# Patient Record
Sex: FEMALE | Race: WHITE
[De-identification: names, ages, dates, MRNs, and addresses within clinical notes are randomized per-mention and may not be internally consistent; named-entity substitution may affect disease eponyms.]

---

## 2021-05-15 ENCOUNTER — TRANSCRIPTION ENCOUNTER (OUTPATIENT)
Age: 86
End: 2021-05-15

## 2022-12-16 ENCOUNTER — INPATIENT (INPATIENT)
Facility: HOSPITAL | Age: 87
LOS: 1 days | DRG: 871 | End: 2022-12-18
Attending: INTERNAL MEDICINE | Admitting: INTERNAL MEDICINE
Payer: MEDICARE

## 2022-12-16 VITALS
DIASTOLIC BLOOD PRESSURE: 75 MMHG | HEART RATE: 118 BPM | WEIGHT: 104.94 LBS | HEIGHT: 62 IN | TEMPERATURE: 102 F | SYSTOLIC BLOOD PRESSURE: 192 MMHG | RESPIRATION RATE: 48 BRPM | OXYGEN SATURATION: 94 %

## 2022-12-16 DIAGNOSIS — J96.01 ACUTE RESPIRATORY FAILURE WITH HYPOXIA: ICD-10-CM

## 2022-12-16 LAB
ALBUMIN SERPL ELPH-MCNC: 1.8 G/DL — LOW (ref 3.3–5)
ALP SERPL-CCNC: 179 U/L — HIGH (ref 40–120)
ALT FLD-CCNC: 55 U/L — HIGH (ref 10–45)
ANION GAP SERPL CALC-SCNC: 18 MMOL/L — HIGH (ref 5–17)
APTT BLD: 33.2 SEC — SIGNIFICANT CHANGE UP (ref 27.5–35.5)
AST SERPL-CCNC: 54 U/L — HIGH (ref 10–40)
BASOPHILS # BLD AUTO: 0.07 K/UL — SIGNIFICANT CHANGE UP (ref 0–0.2)
BASOPHILS NFR BLD AUTO: 0.4 % — SIGNIFICANT CHANGE UP (ref 0–2)
BILIRUB SERPL-MCNC: 0.6 MG/DL — SIGNIFICANT CHANGE UP (ref 0.2–1.2)
BUN SERPL-MCNC: 53 MG/DL — HIGH (ref 7–23)
CALCIUM SERPL-MCNC: 9.5 MG/DL — SIGNIFICANT CHANGE UP (ref 8.4–10.5)
CHLORIDE SERPL-SCNC: 105 MMOL/L — SIGNIFICANT CHANGE UP (ref 96–108)
CO2 SERPL-SCNC: 22 MMOL/L — SIGNIFICANT CHANGE UP (ref 22–31)
CREAT SERPL-MCNC: 1.2 MG/DL — SIGNIFICANT CHANGE UP (ref 0.5–1.3)
EGFR: 44 ML/MIN/1.73M2 — LOW
EOSINOPHIL # BLD AUTO: 0.04 K/UL — SIGNIFICANT CHANGE UP (ref 0–0.5)
EOSINOPHIL NFR BLD AUTO: 0.2 % — SIGNIFICANT CHANGE UP (ref 0–6)
FLUAV AG NPH QL: SIGNIFICANT CHANGE UP
FLUBV AG NPH QL: SIGNIFICANT CHANGE UP
GLUCOSE SERPL-MCNC: 390 MG/DL — HIGH (ref 70–99)
HCT VFR BLD CALC: 33.9 % — LOW (ref 34.5–45)
HGB BLD-MCNC: 11 G/DL — LOW (ref 11.5–15.5)
IMM GRANULOCYTES NFR BLD AUTO: 0.9 % — SIGNIFICANT CHANGE UP (ref 0–0.9)
INR BLD: 1.12 RATIO — SIGNIFICANT CHANGE UP (ref 0.88–1.16)
LACTATE SERPL-SCNC: 1.7 MMOL/L — SIGNIFICANT CHANGE UP (ref 0.7–2)
LYMPHOCYTES # BLD AUTO: 0.62 K/UL — LOW (ref 1–3.3)
LYMPHOCYTES # BLD AUTO: 3.6 % — LOW (ref 13–44)
MCHC RBC-ENTMCNC: 30.5 PG — SIGNIFICANT CHANGE UP (ref 27–34)
MCHC RBC-ENTMCNC: 32.4 GM/DL — SIGNIFICANT CHANGE UP (ref 32–36)
MCV RBC AUTO: 93.9 FL — SIGNIFICANT CHANGE UP (ref 80–100)
MONOCYTES # BLD AUTO: 0.54 K/UL — SIGNIFICANT CHANGE UP (ref 0–0.9)
MONOCYTES NFR BLD AUTO: 3.2 % — SIGNIFICANT CHANGE UP (ref 2–14)
NEUTROPHILS # BLD AUTO: 15.64 K/UL — HIGH (ref 1.8–7.4)
NEUTROPHILS NFR BLD AUTO: 91.7 % — HIGH (ref 43–77)
NRBC # BLD: 0 /100 WBCS — SIGNIFICANT CHANGE UP (ref 0–0)
PLATELET # BLD AUTO: 525 K/UL — HIGH (ref 150–400)
POTASSIUM SERPL-MCNC: 4.1 MMOL/L — SIGNIFICANT CHANGE UP (ref 3.5–5.3)
POTASSIUM SERPL-SCNC: 4.1 MMOL/L — SIGNIFICANT CHANGE UP (ref 3.5–5.3)
PROT SERPL-MCNC: 7.3 G/DL — SIGNIFICANT CHANGE UP (ref 6–8.3)
PROTHROM AB SERPL-ACNC: 13 SEC — SIGNIFICANT CHANGE UP (ref 10.5–13.4)
RBC # BLD: 3.61 M/UL — LOW (ref 3.8–5.2)
RBC # FLD: 13.4 % — SIGNIFICANT CHANGE UP (ref 10.3–14.5)
RSV RNA NPH QL NAA+NON-PROBE: SIGNIFICANT CHANGE UP
SARS-COV-2 RNA SPEC QL NAA+PROBE: DETECTED
SODIUM SERPL-SCNC: 145 MMOL/L — SIGNIFICANT CHANGE UP (ref 135–145)
WBC # BLD: 17.06 K/UL — HIGH (ref 3.8–10.5)
WBC # FLD AUTO: 17.06 K/UL — HIGH (ref 3.8–10.5)

## 2022-12-16 PROCEDURE — 70450 CT HEAD/BRAIN W/O DYE: CPT | Mod: 26

## 2022-12-16 PROCEDURE — 71045 X-RAY EXAM CHEST 1 VIEW: CPT | Mod: 26

## 2022-12-16 PROCEDURE — 99291 CRITICAL CARE FIRST HOUR: CPT

## 2022-12-16 PROCEDURE — 99291 CRITICAL CARE FIRST HOUR: CPT | Mod: CS

## 2022-12-16 PROCEDURE — 93010 ELECTROCARDIOGRAM REPORT: CPT | Mod: 76

## 2022-12-16 RX ORDER — ACETAMINOPHEN 500 MG
650 TABLET ORAL EVERY 6 HOURS
Refills: 0 | Status: DISCONTINUED | OUTPATIENT
Start: 2022-12-16 | End: 2022-12-18

## 2022-12-16 RX ORDER — DEXTROSE 50 % IN WATER 50 %
25 SYRINGE (ML) INTRAVENOUS ONCE
Refills: 0 | Status: DISCONTINUED | OUTPATIENT
Start: 2022-12-16 | End: 2022-12-17

## 2022-12-16 RX ORDER — DEXTROSE 50 % IN WATER 50 %
15 SYRINGE (ML) INTRAVENOUS ONCE
Refills: 0 | Status: DISCONTINUED | OUTPATIENT
Start: 2022-12-16 | End: 2022-12-17

## 2022-12-16 RX ORDER — ACETAMINOPHEN 500 MG
650 TABLET ORAL ONCE
Refills: 0 | Status: COMPLETED | OUTPATIENT
Start: 2022-12-16 | End: 2022-12-16

## 2022-12-16 RX ORDER — SODIUM CHLORIDE 9 MG/ML
1000 INJECTION INTRAMUSCULAR; INTRAVENOUS; SUBCUTANEOUS ONCE
Refills: 0 | Status: COMPLETED | OUTPATIENT
Start: 2022-12-16 | End: 2022-12-16

## 2022-12-16 RX ORDER — AZITHROMYCIN 500 MG/1
TABLET, FILM COATED ORAL
Refills: 0 | Status: DISCONTINUED | OUTPATIENT
Start: 2022-12-16 | End: 2022-12-17

## 2022-12-16 RX ORDER — METOPROLOL TARTRATE 50 MG
5 TABLET ORAL EVERY 6 HOURS
Refills: 0 | Status: DISCONTINUED | OUTPATIENT
Start: 2022-12-16 | End: 2022-12-16

## 2022-12-16 RX ORDER — CHLORHEXIDINE GLUCONATE 213 G/1000ML
1 SOLUTION TOPICAL
Refills: 0 | Status: DISCONTINUED | OUTPATIENT
Start: 2022-12-16 | End: 2022-12-17

## 2022-12-16 RX ORDER — CEFTRIAXONE 500 MG/1
1000 INJECTION, POWDER, FOR SOLUTION INTRAMUSCULAR; INTRAVENOUS EVERY 24 HOURS
Refills: 0 | Status: DISCONTINUED | OUTPATIENT
Start: 2022-12-17 | End: 2022-12-17

## 2022-12-16 RX ORDER — DEXAMETHASONE 0.5 MG/5ML
6 ELIXIR ORAL ONCE
Refills: 0 | Status: DISCONTINUED | OUTPATIENT
Start: 2022-12-16 | End: 2022-12-16

## 2022-12-16 RX ORDER — INSULIN LISPRO 100/ML
VIAL (ML) SUBCUTANEOUS EVERY 6 HOURS
Refills: 0 | Status: DISCONTINUED | OUTPATIENT
Start: 2022-12-16 | End: 2022-12-17

## 2022-12-16 RX ORDER — REMDESIVIR 5 MG/ML
200 INJECTION INTRAVENOUS EVERY 24 HOURS
Refills: 0 | Status: COMPLETED | OUTPATIENT
Start: 2022-12-16 | End: 2022-12-16

## 2022-12-16 RX ORDER — REMDESIVIR 5 MG/ML
100 INJECTION INTRAVENOUS EVERY 24 HOURS
Refills: 0 | Status: DISCONTINUED | OUTPATIENT
Start: 2022-12-17 | End: 2022-12-17

## 2022-12-16 RX ORDER — ENOXAPARIN SODIUM 100 MG/ML
30 INJECTION SUBCUTANEOUS EVERY 24 HOURS
Refills: 0 | Status: DISCONTINUED | OUTPATIENT
Start: 2022-12-16 | End: 2022-12-17

## 2022-12-16 RX ORDER — METOPROLOL TARTRATE 50 MG
5 TABLET ORAL ONCE
Refills: 0 | Status: DISCONTINUED | OUTPATIENT
Start: 2022-12-16 | End: 2022-12-16

## 2022-12-16 RX ORDER — GLUCAGON INJECTION, SOLUTION 0.5 MG/.1ML
1 INJECTION, SOLUTION SUBCUTANEOUS ONCE
Refills: 0 | Status: DISCONTINUED | OUTPATIENT
Start: 2022-12-16 | End: 2022-12-17

## 2022-12-16 RX ORDER — SODIUM CHLORIDE 9 MG/ML
1000 INJECTION, SOLUTION INTRAVENOUS
Refills: 0 | Status: COMPLETED | OUTPATIENT
Start: 2022-12-16 | End: 2022-12-16

## 2022-12-16 RX ORDER — DEXTROSE 50 % IN WATER 50 %
12.5 SYRINGE (ML) INTRAVENOUS ONCE
Refills: 0 | Status: DISCONTINUED | OUTPATIENT
Start: 2022-12-16 | End: 2022-12-17

## 2022-12-16 RX ORDER — METOPROLOL TARTRATE 50 MG
5 TABLET ORAL ONCE
Refills: 0 | Status: COMPLETED | OUTPATIENT
Start: 2022-12-16 | End: 2022-12-16

## 2022-12-16 RX ORDER — DILTIAZEM HCL 120 MG
10 CAPSULE, EXT RELEASE 24 HR ORAL ONCE
Refills: 0 | Status: COMPLETED | OUTPATIENT
Start: 2022-12-16 | End: 2022-12-16

## 2022-12-16 RX ORDER — SODIUM CHLORIDE 9 MG/ML
1000 INJECTION, SOLUTION INTRAVENOUS
Refills: 0 | Status: DISCONTINUED | OUTPATIENT
Start: 2022-12-16 | End: 2022-12-17

## 2022-12-16 RX ORDER — CEFTRIAXONE 500 MG/1
1000 INJECTION, POWDER, FOR SOLUTION INTRAMUSCULAR; INTRAVENOUS ONCE
Refills: 0 | Status: COMPLETED | OUTPATIENT
Start: 2022-12-16 | End: 2022-12-16

## 2022-12-16 RX ORDER — AZITHROMYCIN 500 MG/1
500 TABLET, FILM COATED ORAL ONCE
Refills: 0 | Status: COMPLETED | OUTPATIENT
Start: 2022-12-16 | End: 2022-12-16

## 2022-12-16 RX ORDER — CEFTRIAXONE 500 MG/1
INJECTION, POWDER, FOR SOLUTION INTRAMUSCULAR; INTRAVENOUS
Refills: 0 | Status: DISCONTINUED | OUTPATIENT
Start: 2022-12-16 | End: 2022-12-17

## 2022-12-16 RX ORDER — DEXAMETHASONE 0.5 MG/5ML
6 ELIXIR ORAL ONCE
Refills: 0 | Status: COMPLETED | OUTPATIENT
Start: 2022-12-16 | End: 2022-12-16

## 2022-12-16 RX ORDER — REMDESIVIR 5 MG/ML
INJECTION INTRAVENOUS
Refills: 0 | Status: DISCONTINUED | OUTPATIENT
Start: 2022-12-16 | End: 2022-12-17

## 2022-12-16 RX ORDER — METOPROLOL TARTRATE 50 MG
2.5 TABLET ORAL EVERY 6 HOURS
Refills: 0 | Status: DISCONTINUED | OUTPATIENT
Start: 2022-12-16 | End: 2022-12-17

## 2022-12-16 RX ORDER — AZITHROMYCIN 500 MG/1
500 TABLET, FILM COATED ORAL EVERY 24 HOURS
Refills: 0 | Status: DISCONTINUED | OUTPATIENT
Start: 2022-12-17 | End: 2022-12-17

## 2022-12-16 RX ORDER — DEXAMETHASONE 0.5 MG/5ML
6 ELIXIR ORAL DAILY
Refills: 0 | Status: DISCONTINUED | OUTPATIENT
Start: 2022-12-17 | End: 2022-12-17

## 2022-12-16 RX ADMIN — Medication 5 MILLIGRAM(S): at 21:36

## 2022-12-16 RX ADMIN — SODIUM CHLORIDE 1000 MILLILITER(S): 9 INJECTION INTRAMUSCULAR; INTRAVENOUS; SUBCUTANEOUS at 21:55

## 2022-12-16 RX ADMIN — Medication 650 MILLIGRAM(S): at 21:04

## 2022-12-16 RX ADMIN — SODIUM CHLORIDE 100 MILLILITER(S): 9 INJECTION, SOLUTION INTRAVENOUS at 22:12

## 2022-12-16 RX ADMIN — Medication 650 MILLIGRAM(S): at 20:04

## 2022-12-16 RX ADMIN — Medication 10 MILLIGRAM(S): at 21:36

## 2022-12-16 RX ADMIN — SODIUM CHLORIDE 75 MILLILITER(S): 9 INJECTION, SOLUTION INTRAVENOUS at 23:02

## 2022-12-16 RX ADMIN — Medication 6 MILLIGRAM(S): at 21:35

## 2022-12-16 RX ADMIN — SODIUM CHLORIDE 1000 MILLILITER(S): 9 INJECTION INTRAMUSCULAR; INTRAVENOUS; SUBCUTANEOUS at 20:55

## 2022-12-16 RX ADMIN — CEFTRIAXONE 1000 MILLIGRAM(S): 500 INJECTION, POWDER, FOR SOLUTION INTRAMUSCULAR; INTRAVENOUS at 23:28

## 2022-12-16 NOTE — PROVIDER CONTACT NOTE (EICU) - SITUATION
· HPI Objective Statement: 87-year-old female diagnosed with COVID 2 days ago nursing home resident brought in by EMS for evaluation of shortness of breath EMS found patient was hypoxic and placed on a nonrebreather patient is also unresponsive, the son signed a MOLST form and the patient is a DNR/DNI  Case discussed with ICU PA, Patient admitted for BIPAP.

## 2022-12-16 NOTE — H&P ADULT - NSICDXPASTMEDICALHX_GEN_ALL_CORE_FT
PAST MEDICAL HISTORY:  CVA (cerebrovascular accident)     Hypertension     Paroxysmal atrial fibrillation

## 2022-12-16 NOTE — PATIENT PROFILE ADULT - FALL HARM RISK - HARM RISK INTERVENTIONS

## 2022-12-16 NOTE — H&P ADULT - ASSESSMENT
87-year-old female diagnosed w/ HTN, PAF, on Eliquis, DM2, BIBEMS from [facility] for acute respiratory distress, lethargy, decreased O2 sats to 80's, was placed on NRBM.  In the ED, pt noted to have temp of 102, tachypneic to the 40's, so was switched to BiPAP.  Per son, pt was okay until 3 days ago, became letahrgic, developed a dry cough, chest congestion, dxed to have COVID 12/13. Pt was started on Molunupiravir 12/14.  Son states that yesterday pt seem better, but with decreased oral intake and was unable to do any PT.  Today, pt noted to be very lethargic, tachypneic.   Pt was apparently recently hospitalized at [facility] 3 weeks ago for CVA, was d/ki to [facility] last week.   While in the ED, pt noted to be all of a sudden in rapid afib 170's to 180's, /90, temp of 101.  Pt also received Decadron 6 mg IVP x 1, Rocephin, and Remdesivir.   Pt was given Cardizem 10 mg IVP, lopressor 5 mg IVP x 1 and started on LR.  Pt remains unresponsive, tachypneic to 40's.     Acute hypoxic respiratory failure  Pneumonia due to COVID 19  Atrial fib with RVR  GHADA, dehydration  Metabolic encephalopathy  Pt w/ hx of recent CVA    Admit-will upgrade to ICU - pt requiring NIV due to hypoxia, increased WOB  Cont Remdesivir - 5 day course  Cont Decadron 6 mg IV - 6 day course  IV hydration  Await CT Head  Cont AC - Lovenox   FFup labs  Prognosis guarded and poor- GOC - d/w 2 sons, had MOLST filled out- pt DNR/DNI but they favor trial of NIV at this time, I did open discussion of comfort measures, sons states they wanted to all medical therapy possible at this time.  They dont want feeding tube.    D/w ICU PA

## 2022-12-16 NOTE — ED PROVIDER NOTE - CARE PLAN
Principal Discharge DX:	Acute respiratory failure with hypoxia  Secondary Diagnosis:	AMS (altered mental status)   1

## 2022-12-16 NOTE — H&P ADULT - CONVERSATION DETAILS
Family aware of diagnosis and guarded prognosis, as explained by me.  RADHA reviewed again.  Pt is DNR/DNI but sons favor trial of NIV, in view of hypoxic resp and increased WOB.  Sons are not ready for comfort measures only, at this time.

## 2022-12-16 NOTE — ED PROVIDER NOTE - PHYSICAL EXAMINATION
General:     NAD, well-nourished, well-appearing, unresponsive  Head:     NC/AT, EOMI, oral mucosa moist  Neck:     supple  Lungs:     Coarse rhonchi  CVS:     S1S2, RRR, no m/g/r  Abd:     +BS, s/nt/nd, no organomegaly  Ext:    2+ radial and pedal pulses, no c/c/e  Neuro: grossly intact

## 2022-12-16 NOTE — ED PROVIDER NOTE - CLINICAL SUMMARY MEDICAL DECISION MAKING FREE TEXT BOX
Patient is a elderly female with history of multiple medical problems COVID-positive since yesterday brought in by EMS from nursing home for evaluation of shortness of breath and lethargy.  On exam patient has excessively labored  breathing and hypoxia patient was also had a course wheezing.  Patient was placed on a BiPAP and given a DuoNeb and IV steroid.  Meanwhile patient suddenly developed rapid A. fib and Cardizem was given rate was controlled.  Patient's condition and prognosis were discussed with the son the MOLST form was filled out patient was DNR/DNI.  Patient was advised admitted to AI unit.

## 2022-12-16 NOTE — ED ADULT TRIAGE NOTE - CHIEF COMPLAINT QUOTE
Pt. BIBA for difficulty breathing.  Pt. tachypneic and unresponsive as per family at bedside pt. is normally talking but soft spoken since she had a stroke 11/25/22.  Pt. Covid positive from facility.  ER MD to bedside and pt. placed from nonrebreather to bipap.  Pt. DNR/DNI.

## 2022-12-16 NOTE — ED PROVIDER NOTE - OBJECTIVE STATEMENT
87-year-old female diagnosed with COVID 2 days ago nursing home resident brought in by EMS for evaluation of shortness of breath EMS found patient was hypoxic and placed on a nonrebreather patient is also unresponsive, the son signed a MOLST form and the patient is a DNR/DNI

## 2022-12-16 NOTE — H&P ADULT - NSHPPHYSICALEXAM_GEN_ALL_CORE
Vital Signs (24 Hrs):  T(C): 36.7 (12-16-22 @ 22:19), Max: 38.8 (12-16-22 @ 19:30)  HR: 93 (12-16-22 @ 22:19) (93 - 183)  BP: 179/67 (12-16-22 @ 22:19) (114/81 - 204/70)  RR: 45 (12-16-22 @ 22:19) (43 - 50)  SpO2: 100% (12-16-22 @ 22:19) (88% - 100%)  Wt(kg): --  Daily Height in cm: 157.48 (16 Dec 2022 19:30)    Daily     I&O's Summary

## 2022-12-16 NOTE — H&P ADULT - WHAT MATTERS MOST
Sons are hoping that their mother will recover from illness, would want to give time for medications and interventions to possibly improve medical outcome, at this time.

## 2022-12-16 NOTE — ED ADULT NURSE NOTE - NSIMPLEMENTINTERV_GEN_ALL_ED
Implemented All Fall with Harm Risk Interventions:  [place] to call system. Call bell, personal items and telephone within reach. Instruct patient to call for assistance. Room bathroom lighting operational. Non-slip footwear when patient is off stretcher. Physically safe environment: no spills, clutter or unnecessary equipment. Stretcher in lowest position, wheels locked, appropriate side rails in place. Provide visual cue, wrist band, yellow gown, etc. Monitor gait and stability. Monitor for mental status changes and reorient to person, place, and time. Review medications for side effects contributing to fall risk. Reinforce activity limits and safety measures with patient and family. Provide visual clues: red socks.

## 2022-12-16 NOTE — CONSULT NOTE ADULT - SUBJECTIVE AND OBJECTIVE BOX
Patient is a 87y old  Female who presents with a chief complaint of acute hypoxic resp failure (16 Dec 2022 22:24)      BRIEF HOSPITAL COURSE:    87F , admitted with COVID-19, hypoxemia, intermittent rapid afib and fever to 104F  -initially was stable for floor admission, however then deteriorated, required BiPAP for tachypnea and hypoxia, aswell as intermittent HR's as high as 170    -as aptient unable to come off BiPAp will admit to ICU    she is DNR/DNI, MOLST in chart    PAST MEDICAL & SURGICAL HISTORY:  CVA (cerebrovascular accident)      Paroxysmal atrial fibrillation      Hypertension      No significant past surgical history        Allergies    No Known Allergies    Intolerances      FAMILY HISTORY:  No pertinent family history in first degree relatives        Family history otherwise noncontributory.    Social History: ***  Review of Systems:    ALL OTHER REVIEW OF SYSTEMS EXCEPT PER HPI NEGATIVE.      Medications:  azithromycin  IVPB      azithromycin  IVPB 500 milliGRAM(s) IV Intermittent once  cefTRIAXone   IVPB 1000 milliGRAM(s) IV Intermittent once  cefTRIAXone   IVPB      remdesivir  IVPB 200 milliGRAM(s) IV Intermittent every 24 hours  remdesivir  IVPB   IV Intermittent     metoprolol tartrate Injectable 2.5 milliGRAM(s) IV Push every 6 hours      acetaminophen  Suppository .. 650 milliGRAM(s) Rectal every 6 hours PRN      enoxaparin Injectable 30 milliGRAM(s) SubCutaneous every 24 hours        dextrose 50% Injectable 25 Gram(s) IV Push once  dextrose 50% Injectable 12.5 Gram(s) IV Push once  dextrose 50% Injectable 25 Gram(s) IV Push once  dextrose Oral Gel 15 Gram(s) Oral once  glucagon  Injectable 1 milliGRAM(s) IntraMuscular once  insulin lispro (ADMELOG) corrective regimen sliding scale   SubCutaneous every 6 hours    lactated ringers. 1000 milliLiter(s) IV Continuous <Continuous>      chlorhexidine 2% Cloths 1 Application(s) Topical <User Schedule>            ICU Vital Signs Last 24 Hrs  T(C): 36.7 (16 Dec 2022 22:19), Max: 38.8 (16 Dec 2022 19:30)  T(F): 98 (16 Dec 2022 22:19), Max: 101.8 (16 Dec 2022 19:30)  HR: 93 (16 Dec 2022 22:19) (93 - 183)  BP: 179/67 (16 Dec 2022 22:19) (114/81 - 204/70)  BP(mean): --  ABP: --  ABP(mean): --  RR: 45 (16 Dec 2022 22:19) (43 - 50)  SpO2: 100% (16 Dec 2022 22:19) (88% - 100%)    O2 Parameters below as of 16 Dec 2022 22:19  Patient On (Oxygen Delivery Method): BiPAP/CPAP          Vital Signs Last 24 Hrs  T(C): 36.7 (16 Dec 2022 22:19), Max: 38.8 (16 Dec 2022 19:30)  T(F): 98 (16 Dec 2022 22:19), Max: 101.8 (16 Dec 2022 19:30)  HR: 93 (16 Dec 2022 22:19) (93 - 183)  BP: 179/67 (16 Dec 2022 22:19) (114/81 - 204/70)  BP(mean): --  RR: 45 (16 Dec 2022 22:19) (43 - 50)  SpO2: 100% (16 Dec 2022 22:19) (88% - 100%)    Parameters below as of 16 Dec 2022 22:19  Patient On (Oxygen Delivery Method): BiPAP/CPAP            I&O's Detail        LABS:                        11.0   17.06 )-----------( 525      ( 16 Dec 2022 19:50 )             33.9     12-16    145  |  105  |  53<H>  ----------------------------<  390<H>  4.1   |  22  |  1.20    Ca    9.5      16 Dec 2022 19:50    TPro  7.3  /  Alb  1.8<L>  /  TBili  0.6  /  DBili  x   /  AST  54<H>  /  ALT  55<H>  /  AlkPhos  179<H>  12-16          CAPILLARY BLOOD GLUCOSE        PT/INR - ( 16 Dec 2022 19:50 )   PT: 13.0 sec;   INR: 1.12 ratio         PTT - ( 16 Dec 2022 19:50 )  PTT:33.2 sec    CULTURES:

## 2022-12-16 NOTE — ED ADULT NURSE NOTE - OBJECTIVE STATEMENT
Patient brought in by EMS from [facility] after having difficulty breathing, placed on a nonrebreather switched to Bipap by respiratory. Patient tested positive for COVID+ about two days ago. Family member signed a MOLST form for DNR/DNI. PMH of [place] in L breast and stoke. Family at bedside.

## 2022-12-16 NOTE — H&P ADULT - HISTORY OF PRESENT ILLNESS
87-year-old female diagnosed w/ HTN, PAF, on Eliquis, DM2, BIBEMS from [facility] for acute respiratory distress, lethargy, decreased O2 sats to 80's, was placed on NRBM.  In the ED, pt noted to have temp of 102, tachypneic to the 40's, so was switched to BiPAP.  Per son, pt was okay until 3 days ago, became letahrgic, developed a dry cough, chest congestion, dxed to have COVID 12/13. Pt was started on Molunupiravir 12/14.  Son states that yesterday pt seem better, but with decreased oral intake and was unable to do any PT.  Today, pt noted to be very lethargic, tachypneic.   Pt was apparently recently hospitalized at [facility] 3 weeks ago for CVA, was d/ki to [facility] last week.   While in the ED, pt noted to be all of a sudden in rapid afib 170's to 180's, /90, temp of 101.  Pt also received Decadron 6 mg IVP x 1, Rocephin, and Remdesivir.   Pt was given Cardizem 10 mg IVP, lopressor 5 mg IVP x 1 and started on LR.  Pt remains unresponsive, tachypneic to 40's.

## 2022-12-16 NOTE — ED ADULT NURSE REASSESSMENT NOTE - NS ED NURSE REASSESS COMMENT FT1
Patient noted to have HR 180s, MD Dsouza made aware, Order for Cardizem 10 mg given IVP, Patient Hr 150- 160s, MD made aware, order for Metoprolol 5mg IVP given. HR 95. Patient tried to wean off to 02 6L via nasal cannula, however patient pulse ox 88%. Patient switched back to BiPAP. Family at bedside. Aware of visitation rule. Visitation allowed for one visitor per nursing supervisor. Report given to ICU nurse, transferred to ICU with BiPAP and cardiac monitor in place.

## 2022-12-16 NOTE — CONSULT NOTE ADULT - ASSESSMENT
PLAN:    admit to ICU for BiPAP overnight  Patient currently on BiPAP  -will titrate IPAP and EPAP to maintain pH >7.30 and SaO2 >92%  -alarms reviewed  -NPO while on BiPAP   -starty remdesevir and decadron  -rocephin/azithromycin  -rate control with lopressor  -lovenox  -glycemic control, ISS  -if worsens, will ahve further goals ofc are discussions regarding possible transition to comfort care  -if improves and can come off BiPAp tranfer to AI service      discussed with Dr. shira Chan PLAN:    admit to ICU for BiPAP overnight  Patient currently on BiPAP  -will titrate IPAP and EPAP to maintain pH >7.30 and SaO2 >92%  -alarms reviewed  -NPO while on BiPAP   -starty remdesevir and decadron  -rocephin/azithromycin  -rate control with lopressor  -lovenox  -glycemic control, ISS  -if worsens, will ahve further goals ofc are discussions regarding possible transition to comfort care  -if improves and can come off BiPAp tranfer to AI service  -will go for head CT as Hx of CVAs and currently she is not responsive      discussed with Dr. shira Chan

## 2022-12-17 LAB
ANION GAP SERPL CALC-SCNC: 14 MMOL/L — SIGNIFICANT CHANGE UP (ref 5–17)
BUN SERPL-MCNC: 54 MG/DL — HIGH (ref 7–23)
CALCIUM SERPL-MCNC: 9 MG/DL — SIGNIFICANT CHANGE UP (ref 8.4–10.5)
CHLORIDE SERPL-SCNC: 109 MMOL/L — HIGH (ref 96–108)
CO2 SERPL-SCNC: 25 MMOL/L — SIGNIFICANT CHANGE UP (ref 22–31)
CREAT SERPL-MCNC: 1.11 MG/DL — SIGNIFICANT CHANGE UP (ref 0.5–1.3)
EGFR: 49 ML/MIN/1.73M2 — LOW
GLUCOSE BLDC GLUCOMTR-MCNC: 354 MG/DL — HIGH (ref 70–99)
GLUCOSE BLDC GLUCOMTR-MCNC: 379 MG/DL — HIGH (ref 70–99)
GLUCOSE SERPL-MCNC: 400 MG/DL — HIGH (ref 70–99)
HCT VFR BLD CALC: 29 % — LOW (ref 34.5–45)
HGB BLD-MCNC: 9.5 G/DL — LOW (ref 11.5–15.5)
MAGNESIUM SERPL-MCNC: 2.4 MG/DL — SIGNIFICANT CHANGE UP (ref 1.6–2.6)
MCHC RBC-ENTMCNC: 30.7 PG — SIGNIFICANT CHANGE UP (ref 27–34)
MCHC RBC-ENTMCNC: 32.8 GM/DL — SIGNIFICANT CHANGE UP (ref 32–36)
MCV RBC AUTO: 93.9 FL — SIGNIFICANT CHANGE UP (ref 80–100)
NRBC # BLD: 0 /100 WBCS — SIGNIFICANT CHANGE UP (ref 0–0)
PHOSPHATE SERPL-MCNC: 2.7 MG/DL — SIGNIFICANT CHANGE UP (ref 2.5–4.5)
PLATELET # BLD AUTO: 400 K/UL — SIGNIFICANT CHANGE UP (ref 150–400)
POTASSIUM SERPL-MCNC: 3.7 MMOL/L — SIGNIFICANT CHANGE UP (ref 3.5–5.3)
POTASSIUM SERPL-SCNC: 3.7 MMOL/L — SIGNIFICANT CHANGE UP (ref 3.5–5.3)
RBC # BLD: 3.09 M/UL — LOW (ref 3.8–5.2)
RBC # FLD: 13.6 % — SIGNIFICANT CHANGE UP (ref 10.3–14.5)
SODIUM SERPL-SCNC: 148 MMOL/L — HIGH (ref 135–145)
WBC # BLD: 16.24 K/UL — HIGH (ref 3.8–10.5)
WBC # FLD AUTO: 16.24 K/UL — HIGH (ref 3.8–10.5)

## 2022-12-17 RX ORDER — MORPHINE SULFATE 50 MG/1
2 CAPSULE, EXTENDED RELEASE ORAL ONCE
Refills: 0 | Status: DISCONTINUED | OUTPATIENT
Start: 2022-12-17 | End: 2022-12-17

## 2022-12-17 RX ORDER — INSULIN LISPRO 100/ML
VIAL (ML) SUBCUTANEOUS EVERY 4 HOURS
Refills: 0 | Status: DISCONTINUED | OUTPATIENT
Start: 2022-12-17 | End: 2022-12-17

## 2022-12-17 RX ORDER — INSULIN GLARGINE 100 [IU]/ML
10 INJECTION, SOLUTION SUBCUTANEOUS ONCE
Refills: 0 | Status: DISCONTINUED | OUTPATIENT
Start: 2022-12-17 | End: 2022-12-17

## 2022-12-17 RX ORDER — MORPHINE SULFATE 50 MG/1
4 CAPSULE, EXTENDED RELEASE ORAL
Qty: 100 | Refills: 0 | Status: DISCONTINUED | OUTPATIENT
Start: 2022-12-17 | End: 2022-12-18

## 2022-12-17 RX ORDER — POTASSIUM CHLORIDE 20 MEQ
10 PACKET (EA) ORAL
Refills: 0 | Status: DISCONTINUED | OUTPATIENT
Start: 2022-12-17 | End: 2022-12-17

## 2022-12-17 RX ORDER — MORPHINE SULFATE 50 MG/1
2 CAPSULE, EXTENDED RELEASE ORAL
Refills: 0 | Status: DISCONTINUED | OUTPATIENT
Start: 2022-12-17 | End: 2022-12-18

## 2022-12-17 RX ADMIN — MORPHINE SULFATE 2 MILLIGRAM(S): 50 CAPSULE, EXTENDED RELEASE ORAL at 12:36

## 2022-12-17 RX ADMIN — CHLORHEXIDINE GLUCONATE 1 APPLICATION(S): 213 SOLUTION TOPICAL at 06:43

## 2022-12-17 RX ADMIN — MORPHINE SULFATE 2 MILLIGRAM(S): 50 CAPSULE, EXTENDED RELEASE ORAL at 13:00

## 2022-12-17 RX ADMIN — MORPHINE SULFATE 2 MILLIGRAM(S): 50 CAPSULE, EXTENDED RELEASE ORAL at 10:05

## 2022-12-17 RX ADMIN — MORPHINE SULFATE 2 MILLIGRAM(S): 50 CAPSULE, EXTENDED RELEASE ORAL at 16:24

## 2022-12-17 RX ADMIN — MORPHINE SULFATE 2 MILLIGRAM(S): 50 CAPSULE, EXTENDED RELEASE ORAL at 13:58

## 2022-12-17 RX ADMIN — Medication 650 MILLIGRAM(S): at 02:15

## 2022-12-17 RX ADMIN — Medication 6 MILLIGRAM(S): at 06:08

## 2022-12-17 RX ADMIN — Medication 5: at 06:41

## 2022-12-17 RX ADMIN — Medication 2 MILLIGRAM(S): at 21:20

## 2022-12-17 RX ADMIN — MORPHINE SULFATE 2 MILLIGRAM(S): 50 CAPSULE, EXTENDED RELEASE ORAL at 19:53

## 2022-12-17 RX ADMIN — Medication 2.5 MILLIGRAM(S): at 01:21

## 2022-12-17 RX ADMIN — Medication 5: at 02:16

## 2022-12-17 RX ADMIN — MORPHINE SULFATE 2 MILLIGRAM(S): 50 CAPSULE, EXTENDED RELEASE ORAL at 20:34

## 2022-12-17 RX ADMIN — REMDESIVIR 200 MILLIGRAM(S): 5 INJECTION INTRAVENOUS at 01:20

## 2022-12-17 RX ADMIN — MORPHINE SULFATE 2 MILLIGRAM(S): 50 CAPSULE, EXTENDED RELEASE ORAL at 17:15

## 2022-12-17 RX ADMIN — MORPHINE SULFATE 4 MG/HR: 50 CAPSULE, EXTENDED RELEASE ORAL at 21:20

## 2022-12-17 RX ADMIN — Medication 650 MILLIGRAM(S): at 01:24

## 2022-12-17 RX ADMIN — MORPHINE SULFATE 2 MILLIGRAM(S): 50 CAPSULE, EXTENDED RELEASE ORAL at 10:20

## 2022-12-17 RX ADMIN — ENOXAPARIN SODIUM 30 MILLIGRAM(S): 100 INJECTION SUBCUTANEOUS at 01:22

## 2022-12-17 RX ADMIN — Medication 2.5 MILLIGRAM(S): at 06:09

## 2022-12-17 RX ADMIN — MORPHINE SULFATE 2 MILLIGRAM(S): 50 CAPSULE, EXTENDED RELEASE ORAL at 14:30

## 2022-12-17 RX ADMIN — AZITHROMYCIN 500 MILLIGRAM(S): 500 TABLET, FILM COATED ORAL at 01:21

## 2022-12-17 NOTE — DIETITIAN INITIAL EVALUATION ADULT - PERTINENT MEDS FT
MEDICATIONS  (STANDING):    MEDICATIONS  (PRN):  acetaminophen  Suppository .. 650 milliGRAM(s) Rectal every 6 hours PRN Temp greater or equal to 38C (100.4F)  morphine  - Injectable 2 milliGRAM(s) IV Push every 2 hours PRN Dyspnea, inc WOB and / or pain

## 2022-12-17 NOTE — DIETITIAN INITIAL EVALUATION ADULT - NSFNSPHYEXAMSKINFT_GEN_A_CORE
Group Therapy Note    Date: 10/7/2020    Group Start Time: 1005  Group End Time: [place]        Group Therapy Note    Attendees: 8    Patient's Goal: to actively engage in playing Music Bingo to increase memory recall, increase socialization, reminisce, encourage engagement in leisure, and improve mood. Notes:  Mel actively engaged in group while in attendance. Pt was excused from portion to meet with staff. eMl actively and independently recalled songs/artists and covered her bingo card. Pt participated in processing discussion and was observed smiling and positively socializing with peers. Status After Intervention:  Improved    Participation Level:  Active Listener and Interactive    Participation Quality: Appropriate, Attentive, Sharing and Supportive      Speech:  normal      Thought Process/Content: Linear      Affective Functioning: Congruent      Mood: euthymic      Level of consciousness:  Alert and Attentive      Response to Learning: Able to verbalize current knowledge/experience, Able to verbalize/acknowledge new learning and Progressing to goal      Endings: None Reported    Modes of Intervention: Support, Socialization, Problem-solving, Activity and Media      Discipline Responsible: Psychoeducational Specialist      Signature:  JERARDO Matos Pressure Injury 1: right buttock, Stage II  Pressure Injury 2: right lower buttock, Stage II  Pressure Injury 3: right buttock, Suspected deep tissue injury  Pressure Injury 4: none, none  Pressure Injury 5: none, none  Pressure Injury 6: none, none  Pressure Injury 7: none, none  Pressure Injury 8: none, none  Pressure Injury 9: none, none  Pressure Injury 10: none, none  Pressure Injury 11: none, none

## 2022-12-17 NOTE — PROGRESS NOTE ADULT - ASSESSMENT
Physical Examination:  GENERAL:               unresponsive mod  acute distress.    HEENT:                     JVD, Moist MM  PULM:                     Bilateral air entry, course to auscultation bilaterally, no significant sputum production, No Rales, No Rhonchi, No Wheezing  CVS:                         S1, S2,  +Murmur  ABD:                        Soft, nondistended, nontender, normoactive bowel sounds,   EXT:                         No edema, nontender, No Cyanosis or Clubbing   Vascular:                cool Extremities,   SKIN:                     Left breast lesion  NEURO:                  Unresponsive does not follow commands  PSYC:                      Calm,  no  Insight.       Assessment  Acute hypoxic respiratory Failure  COVID +   Metastatic breast cancer  recent CVA  DM2 with hyperglycemia     underlying HTN       Plan  Transition care to Comfort  d/c niv start nc  morphine push  d/c decadron remdisvir and antibiotics   new molst filled  d/c IVF  arguello for comfort        PMD:				                   Notified(Date):  Family Updated: 	Marek Khan and shaun bedside	                                 Date: 12/17/22      Sedation & Analgesia:	Morphine  Diet/Nutrition:		   Diet, NPO (12-16-22 @ 22:21) [Active]    GI PPx:			n/a    DVT Ppx:		n/a       Activity:		  bedrest  Head of Bed:               35-45 Deg  Glycemic Control:              Lines:  PIV   Restraints were deemed necessary to prevent removal of life-sustaining devices [  ] YES   [ n   ]  NO    Disposition:  Transfer to [facility]    Goals of Care: DNR/I Comfort care

## 2022-12-17 NOTE — DIETITIAN INITIAL EVALUATION ADULT - SIGNS/SYMPTOMS
PI on R buttocks stage 2, R lower buttock stage 2, R buttock suspected DTI. PO <75% x >7days, 3.3% wt loss x 1 week, 79% of IBW

## 2022-12-17 NOTE — PROGRESS NOTE ADULT - CONVERSATION DETAILS
Case discussed current satus  discussed how covid is potentially reversible but will take time to see improvement  patient currently lethargic minimally responsive unable to follow commands  discussed GOC and treatment options including NGT feeding and treatment of hyperglycemia with insulin drip  after detailed discussion they state patient would not want to be in this situation and would wand to be comfort measures  they request to be removed from NIV stop antibiotics and start comfort care  new Molst filled  understand that this direction will lead to her passing and do not want to continue aggressive care in ICU

## 2022-12-17 NOTE — DIETITIAN INITIAL EVALUATION ADULT - OTHER INFO
H&P: 86 y/o F dx of HTN, Breast cancer with mets  PAF, DM2, BIBEMS from [facility] for acute respiratory distress, lethargy, decreased O2 sats to 80's, was placed on NRBM.  In the ED, pt noted to have temp of 102, tachypneic to the 40's, so was switched to BiPAP.  Per son, pt was okay until 3 days ago, became lethargic, developed a dry cough, chest congestion, dxed to have COVID 12/13. Son states that 12/15 pt seem better, but with decreased oral intake and unable to do any PT.  At admission 12/16, pt noted very lethargic, tachypneic.  Pt was apparently recently hospitalized at [facility] 3 weeks ago for CVA, was d/ki to [facility] last week. While in the ED, pt noted to be all of a sudden in rapid afib 170's to 180's, /90, temp of 101.  Pt received Decadron 6 mg IVP x 1, Rocephin, and Remdesivir.  Pt was given Cardizem 10 mg IVP, lopressor 5 mg IVP x 1 and started on LR.  Pt remains unresponsive, tachypneic to 40's.  Critical care physician discussed w/ family pt's prognosis, family decided comfort care measures for pt.     Pt is unconscious, NPO on IVF Lactated Ringers @ 75mL/hr. Family stated pt's baseline weight is 90 Lbs x 2 years, in admission current wt 87.4 Lbs. No GI distress reported, last BM 12/16. Skin w/ PI on R buttocks stage 2, R lower buttock stage 2, R buttock suspected DTI.

## 2022-12-17 NOTE — DIETITIAN INITIAL EVALUATION ADULT - ADD RECOMMEND
*advance diet to Regular, consistency as per SLP, as feasible w/ medical condition   *add Ensure High Protein BID (provide 350 calories, 20 gm protein per 8 oz), and Prosource 30 mL BID (60 calories, 15 gm protein / 30mL pack)   *add MVI and Vit C   *Obtain and record PO intake and weights daily when diet is resume

## 2022-12-17 NOTE — DIETITIAN NUTRITION RISK NOTIFICATION - ADDITIONAL COMMENTS/DIETITIAN RECOMMENDATIONS
Provide feedings 1:1 when PO diet is resume.  Obtain and record PO intake and weights daily when diet is resume.

## 2022-12-17 NOTE — DIETITIAN INITIAL EVALUATION ADULT - PERTINENT LABORATORY DATA
12-17    148<H>  |  109<H>  |  54<H>  ----------------------------<  400<H>  3.7   |  25  |  1.11    Ca    9.0      17 Dec 2022 06:35  Phos  2.7     12-17  Mg     2.4     12-17    TPro  7.3  /  Alb  1.8<L>  /  TBili  0.6  /  DBili  x   /  AST  54<H>  /  ALT  55<H>  /  AlkPhos  179<H>  12-16  POCT Blood Glucose.: 354 mg/dL (12-17-22 @ 06:19)

## 2022-12-17 NOTE — DIETITIAN NUTRITION RISK NOTIFICATION - TREATMENT: THE FOLLOWING DIET HAS BEEN RECOMMENDED
Advance diet to regular, consistency as per SLP, when medically feasible.  Suggest to add MVI and Vit C to promote wound healing.  Add Ensure High Protein BID (provide 350 calories, 20 gm protein per 8 oz)   Add Prosource 30 mL BID (60 calories, 15 gm protein / 30mL pack)

## 2022-12-17 NOTE — PROGRESS NOTE ADULT - ASSESSMENT
PLAN:    -DNR/DNI, MOLST in cart  -converted to comfort measures today  -has been recieving IVP morphine during day shift, however given (+) COVID-19 status will start morphine drip tonight, to minimize number of times RN has to enter room as well as allowing better comfort for patient, will titrate morphine gtt for comfort, will keep PRN IVP morphine if signs of distress occur, and will add IVP ativan PRN   -family aware of plan, will update as needed

## 2022-12-18 PROCEDURE — 82962 GLUCOSE BLOOD TEST: CPT

## 2022-12-18 PROCEDURE — 96374 THER/PROPH/DIAG INJ IV PUSH: CPT

## 2022-12-18 PROCEDURE — 84100 ASSAY OF PHOSPHORUS: CPT

## 2022-12-18 PROCEDURE — 96375 TX/PRO/DX INJ NEW DRUG ADDON: CPT

## 2022-12-18 PROCEDURE — 87637 SARSCOV2&INF A&B&RSV AMP PRB: CPT

## 2022-12-18 PROCEDURE — 96361 HYDRATE IV INFUSION ADD-ON: CPT

## 2022-12-18 PROCEDURE — 85025 COMPLETE CBC W/AUTO DIFF WBC: CPT

## 2022-12-18 PROCEDURE — 85610 PROTHROMBIN TIME: CPT

## 2022-12-18 PROCEDURE — 80048 BASIC METABOLIC PNL TOTAL CA: CPT

## 2022-12-18 PROCEDURE — 85730 THROMBOPLASTIN TIME PARTIAL: CPT

## 2022-12-18 PROCEDURE — 87040 BLOOD CULTURE FOR BACTERIA: CPT

## 2022-12-18 PROCEDURE — 80053 COMPREHEN METABOLIC PANEL: CPT

## 2022-12-18 PROCEDURE — 36415 COLL VENOUS BLD VENIPUNCTURE: CPT

## 2022-12-18 PROCEDURE — 83605 ASSAY OF LACTIC ACID: CPT

## 2022-12-18 PROCEDURE — 99291 CRITICAL CARE FIRST HOUR: CPT

## 2022-12-18 PROCEDURE — 70450 CT HEAD/BRAIN W/O DYE: CPT | Mod: MA

## 2022-12-18 PROCEDURE — 85027 COMPLETE CBC AUTOMATED: CPT

## 2022-12-18 PROCEDURE — 83735 ASSAY OF MAGNESIUM: CPT

## 2022-12-18 PROCEDURE — 71045 X-RAY EXAM CHEST 1 VIEW: CPT

## 2022-12-18 PROCEDURE — 94660 CPAP INITIATION&MGMT: CPT

## 2022-12-18 PROCEDURE — 93005 ELECTROCARDIOGRAM TRACING: CPT

## 2022-12-18 RX ADMIN — Medication 2 MILLIGRAM(S): at 01:54

## 2022-12-18 NOTE — PROGRESS NOTE ADULT - ASSESSMENT
Physical Examination:  GENERAL:               sedated, cofmortable   HEENT:                     JVD, Moist MM  PULM:                     Bilateral air entry, course to auscultation bilaterally, no significant sputum production, No Rales, No Rhonchi, No Wheezing hypoventilating  CVS:                         S1, S2,  +Murmur  ABD:                        Soft, nondistended, nontender, normoactive bowel sounds,   EXT:                         No edema, nontender, No Cyanosis or Clubbing   Vascular:                cool Extremities,   SKIN:                     Left breast lesion  NEURO:                  Unresponsive does not follow commands  PSYC:                      Calm,  no  Insight.       Assessment  Acute hypoxic respiratory Failure  COVID +   Metastatic breast cancer  recent CVA  DM2 with hyperglycemia     underlying HTN       Plan  Transition care to Comfort  on morphine dirp         PMD:				                   Notified(Date):  Family Updated: 	Marek Khan and shanu bedside	                                 Date: 12/18/22      Sedation & Analgesia:	Morphine  Diet/Nutrition:		   Diet, NPO (12-16-22 @ 22:21) [Active]    GI PPx:			n/a    DVT Ppx:		n/a       Activity:		  bedrest  Head of Bed:               35-45 Deg  Glycemic Control:              Lines:  PIV   Restraints were deemed necessary to prevent removal of life-sustaining devices [  ] YES   [ n   ]  NO    Disposition:  Transfer to [facility]    Goals of Care: DNR/I Comfort care  Physical Examination:  GENERAL:               sedated, cofmortable   HEENT:                     JVD, Moist MM  PULM:                     Bilateral air entry, course to auscultation bilaterally, no significant sputum production, No Rales, No Rhonchi, No Wheezing hypoventilating  CVS:                         S1, S2,  +Murmur  ABD:                        Soft, nondistended, nontender, normoactive bowel sounds,   EXT:                         No edema, nontender, No Cyanosis or Clubbing   Vascular:                cool Extremities,   SKIN:                     Left breast lesion  NEURO:                  Unresponsive does not follow commands  PSYC:                      Calm,  no  Insight.       Assessment  Acute hypoxic respiratory Failure  COVID +   Metastatic breast cancer  recent CVA  DM2 with hyperglycemia     underlying HTN       Plan  Transition care to Comfort  on morphine dirp  palliative care f/u  patient has started the dieing process.   emotional support offered to family        PMD:				                   Notified(Date):  Family Updated: 	Marek Khan and shaun bedside	                                 Date: 12/18/22      Sedation & Analgesia:	Morphine  Diet/Nutrition:		   Diet, NPO (12-16-22 @ 22:21) [Active]    GI PPx:			n/a    DVT Ppx:		n/a       Activity:		  bedrest  Head of Bed:               35-45 Deg  Glycemic Control:              Lines:  PIV   Restraints were deemed necessary to prevent removal of life-sustaining devices [  ] YES   [ n   ]  NO    Disposition:  Transfer to [facility]    Goals of Care: DNR/I Comfort care

## 2022-12-18 NOTE — DISCHARGE NOTE FOR THE EXPIRED PATIENT - HOSPITAL COURSE
Patient was admitted for respiratory distress, found to have COVID +. She was brought to the critical care unit and diagnosed with the following:  Acute hypoxic respiratory Failure  COVID +   Metastatic breast cancer  recent CVA  DM2 with hyperglycemia.    Family (HCP) decided to make her DNR, comfort care and patient was initiated on morphine infusion.  Patient continued to deteriorate with slow decelerations of heart rate.   She was eventually pronounced dead today at 13:21.  Family was at bedside declined autopsy. Patient also does not meet criteria for autopsy (patient has know metastatic cancer and recent CVA) Patient was admitted for respiratory distress, found to have COVID +. She was brought to the critical care unit and diagnosed with the following:  Acute hypoxic respiratory Failure  COVID +   Metastatic breast cancer  recent CVA  DM2 with hyperglycemia.    Family (HCP) decided to make her DNR, comfort care and patient was initiated on morphine infusion.  Patient continued to deteriorate with slow decelerations of heart rate.   She was eventually pronounced dead today at 13:21.  Family was at bedside declined autopsy. Patient also does not meet criteria for autopsy (patient has know metastatic cancer and recent CVA)    For full account of hospital course please refer to actual medical records. As this is a brief summery.

## 2022-12-18 NOTE — PROGRESS NOTE ADULT - SUBJECTIVE AND OBJECTIVE BOX
Follow-up Critical Care Progress Note  Chief Complaint : Acute respiratory failure with hypoxia      Patient seen and examined  comfortable  on morphine drip       Allergies :No Known Allergies      PAST MEDICAL & SURGICAL HISTORY:  CVA (cerebrovascular accident)    Paroxysmal atrial fibrillation    Hypertension    No significant past surgical history        Medications:  MEDICATIONS  (STANDING):  morphine  Infusion. 4 mG/Hr (4 mL/Hr) IV Continuous <Continuous>    MEDICATIONS  (PRN):  acetaminophen  Suppository .. 650 milliGRAM(s) Rectal every 6 hours PRN Temp greater or equal to 38C (100.4F)  LORazepam   Injectable 2 milliGRAM(s) IV Push every 2 hours PRN EOL care  morphine  - Injectable 2 milliGRAM(s) IV Push every 2 hours PRN Dyspnea, inc WOB and / or pain      Antibiotics History  azithromycin  IVPB    , 12-16-22 @ 22:40  azithromycin  IVPB 500 milliGRAM(s) IV Intermittent once, 12-16-22 @ 22:39  azithromycin  IVPB 500 milliGRAM(s) IV Intermittent every 24 hours, 12-17-22 @ 22:39, Stop order after: 5 Days  cefTRIAXone   IVPB 1000 milliGRAM(s) IV Intermittent once, 12-16-22 @ 22:39, Stop order after: 1 Doses  cefTRIAXone   IVPB 1000 milliGRAM(s) IV Intermittent every 24 hours, 12-17-22 @ 22:39, Stop order after: 5 Days  cefTRIAXone   IVPB    , 12-16-22 @ 22:40  remdesivir  IVPB 200 milliGRAM(s) IV Intermittent every 24 hours, 12-16-22 @ 21:58, Stop order after: 1 Doses  remdesivir  IVPB 100 milliGRAM(s) IV Intermittent every 24 hours, 12-17-22 @ 21:58, Stop order after: 4 Doses  remdesivir  IVPB   IV Intermittent , 12-16-22 @ 23:29         COVID  12-16-22 @ 20:12  COVID -   Detected<!>     WBC Trend  12-17-22 @ 06:35   -  16.24<H>  12-16-22 @ 19:50   -  17.06<H>    H/H Trend  12-17-22 @ 06:35   -   9.5<L>/ 29.0<L>  12-16-22 @ 19:50   -   11.0<L>/ 33.9<L>    Stool Occult Blood    Platelet Trend  12-17-22 @ 06:35   -  400  12-16-22 @ 19:50   -  525<H>    Trend Sodium  12-17-22 @ 06:35   -  148<H>  12-16-22 @ 19:50   -  145    Trend Potassium  12-17-22 @ 06:35   -  3.7  12-16-22 @ 19:50   -  4.1    Trend Bun/Cr  12-17-22 @ 06:35  BUN/CR -  54<H> / 1.11  12-16-22 @ 19:50  BUN/CR -  53<H> / 1.20    Lactic Acid Trend  12-16-22 @ 19:50   -   1.7    ABG Trend    Trend AST/ALT/ALK Phos/Bili  12-16-22 @ 19:50   54<H>/55<H>/179<H>/0.6      Ammonia Trend      Amylase / Lipase Trend      Albumin Trend  12-16-22 @ 19:50   -   1.8<L>      PTT - PT - INR Trend  12-16-22 @ 19:50   -   33.2 - 13.0 - 1.12    Glucose Trend  12-17-22 @ 06:35   -  -- 400<H> --  12-17-22 @ 06:19   -  -- -- 354<H>  12-17-22 @ 01:31   -  -- -- 379<H>  12-16-22 @ 19:50   -  -- 390<H> --        LABS:                        9.5    16.24 )-----------( 400      ( 17 Dec 2022 06:35 )             29.0     12-17    148<H>  |  109<H>  |  54<H>  ----------------------------<  400<H>  3.7   |  25  |  1.11    Ca    9.0      17 Dec 2022 06:35  Phos  2.7     12-17  Mg     2.4     12-17    TPro  7.3  /  Alb  1.8<L>  /  TBili  0.6  /  DBili  x   /  AST  54<H>  /  ALT  55<H>  /  AlkPhos  179<H>  12-16            PT/INR - ( 16 Dec 2022 19:50 )   PT: 13.0 sec;   INR: 1.12 ratio         PTT - ( 16 Dec 2022 19:50 )  PTT:33.2 sec            CULTURES: (if applicable)    Culture - Blood (collected 12-16-22 @ 19:50)  Source: .Blood Blood-Peripheral  Preliminary Report (12-18-22 @ 02:02):    No growth to date.    Culture - Blood (collected 12-16-22 @ 19:50)  Source: .Blood Blood-Peripheral  Preliminary Report (12-18-22 @ 02:02):    No growth to date.            CAPILLARY BLOOD GLUCOSE      POCT Blood Glucose.: 354 mg/dL (17 Dec 2022 06:19)      RADIOLOGY  CXR:      CT:    ECHO:      VITALS:  T(C): 36.3 (12-18-22 @ 10:00), Max: 36.3 (12-18-22 @ 10:00)  T(F): 97.3 (12-18-22 @ 10:00), Max: 97.3 (12-18-22 @ 10:00)  HR: 72 (12-18-22 @ 10:00) (72 - 175)  BP: --  BP(mean): --  ABP: --  ABP(mean): --  RR: 22 (12-18-22 @ 10:00) (22 - 56)  SpO2: 94% (12-18-22 @ 10:00) (94% - 100%)  CVP(mm Hg): --  CVP(cm H2O): --    Ins and Outs       Height (cm): 157.5 (12-16-22 @ 23:36)  Weight (kg): 39.6 (12-16-22 @ 23:36)  BMI (kg/m2): 16 (12-16-22 @ 23:36)        I&O's Detail     
DEATH NOTE    Called to bedside to evaluate the patient for no hear rate .     On physical exam, patient did not respond to verbal or noxious stimuli.  No spontaneous respirations.  Absent heart and breath sounds.  Absent radial and carotid pulses.   Pupils are fixed and dilated, no corneal reflex.  Telemetry monitor shows asystole.   Patient pronounced dead at 13:21.  DNR / DNI in chart, patient was on comfort care. Attending notified.  Family (son Bill) declined autopsy.
F/U Note:    87y Female admitted with COVID-19, hypoxemic resp. failure and rapid afib. Showed signs of deterioration despite treatment, now made comfort measures by family          Vital Signs Last 24 Hrs  T(C): 36.3 (17 Dec 2022 08:30), Max: 38.4 (16 Dec 2022 23:30)  T(F): 97.4 (17 Dec 2022 08:30), Max: 101.2 (16 Dec 2022 23:30)  HR: 86 (17 Dec 2022 08:30) (83 - 183)  BP: 135/65 (17 Dec 2022 08:30) (114/81 - 204/70)  BP(mean): 84 (17 Dec 2022 08:30) (84 - 100)  RR: 29 (17 Dec 2022 08:30) (15 - 50)  SpO2: 99% (17 Dec 2022 08:30) (88% - 100%)    Parameters below as of 17 Dec 2022 08:30  Patient On (Oxygen Delivery Method): BiPAP/CPAP                                9.5    16.24 )-----------( 400      ( 17 Dec 2022 06:35 )             29.0         12-17    148<H>  |  109<H>  |  54<H>  ----------------------------<  400<H>  3.7   |  25  |  1.11    Ca    9.0      17 Dec 2022 06:35  Phos  2.7     12-17  Mg     2.4     12-17    TPro  7.3  /  Alb  1.8<L>  /  TBili  0.6  /  DBili  x   /  AST  54<H>  /  ALT  55<H>  /  AlkPhos  179<H>  12-16                NEURO: Sedated  CV: (+) S1/S2, irregularly irregular, no MRG   RESP: CTA b/l  GI: soft, non tender            
ICU CONSULT  Location of Patient :  [facility] [id number] ( [facility])  Attending requesting Consult:Alonso Denny  Chief Complaint :     Reason For consult :      Initial HPI on admission:  HPI:  87-year-old female diagnosed w/ HTN, Breast cancer with mets  PAF, on Eliquis, DM2, BIBEMS from [facility] for acute respiratory distress, lethargy, decreased O2 sats to 80's, was placed on NRBM.  In the ED, pt noted to have temp of 102, tachypneic to the 40's, so was switched to BiPAP.  Per son, pt was okay until 3 days ago, became letahrgic, developed a dry cough, chest congestion, dxed to have COVID 12/13. Pt was started on Molunupiravir 12/14.  Son states that yesterday pt seem better, but with decreased oral intake and was unable to do any PT.  Today, pt noted to be very lethargic, tachypneic.   Pt was apparently recently hospitalized at [facility] 3 weeks ago for CVA, was d/ki to [facility] last week.   While in the ED, pt noted to be all of a sudden in rapid afib 170's to 180's, /90, temp of 101.  Pt also received Decadron 6 mg IVP x 1, Rocephin, and Remdesivir.   Pt was given Cardizem 10 mg IVP, lopressor 5 mg IVP x 1 and started on LR.  Pt remains unresponsive, tachypneic to 40's.  (16 Dec 2022 22:24)      BRIEF HOSPITAL COURSE:   this am patient lethargic/minimally responsive on NIV  unable to provide history or ros    Family bedside updated - see elyssa     PAST MEDICAL & SURGICAL HISTORY:  CVA (cerebrovascular accident)      Paroxysmal atrial fibrillation      Hypertension      No significant past surgical history        Allergies    No Known Allergies    Intolerances      FAMILY HISTORY:  No pertinent family history in first degree relatives      Social history: Social History:  currently at [facility] (16 Dec 2022 22:24)      Due to current medical status patient unable to provide medical, social, family history.  History obtained from available medical record.     Antibiotics History  azithromycin  IVPB    , 12-16-22 @ 22:40  azithromycin  IVPB 500 milliGRAM(s) IV Intermittent once, 12-16-22 @ 22:39  azithromycin  IVPB 500 milliGRAM(s) IV Intermittent every 24 hours, 12-17-22 @ 22:39, Stop order after: 5 Days  cefTRIAXone   IVPB 1000 milliGRAM(s) IV Intermittent once, 12-16-22 @ 22:39, Stop order after: 1 Doses  cefTRIAXone   IVPB 1000 milliGRAM(s) IV Intermittent every 24 hours, 12-17-22 @ 22:39, Stop order after: 5 Days  cefTRIAXone   IVPB    , 12-16-22 @ 22:40  remdesivir  IVPB 200 milliGRAM(s) IV Intermittent every 24 hours, 12-16-22 @ 21:58, Stop order after: 1 Doses  remdesivir  IVPB 100 milliGRAM(s) IV Intermittent every 24 hours, 12-17-22 @ 21:58, Stop order after: 4 Doses  remdesivir  IVPB   IV Intermittent , 12-16-22 @ 23:29      Heme Medications       GI Medications        Home Medications:  Last Order Reconciliation Date: 12-16-22 (Admission Reconciliation)  amiodarone 200 mg oral tablet: 1 tab(s) orally once a day (12-16-22)  apixaban 2.5 mg oral tablet: 1 tab(s) orally 2 times a day (12-16-22)  ascorbic acid 500 mg oral tablet: 1 tab(s) orally once a day (12-16-22)  aspirin 81 mg oral delayed release tablet: 1 tab(s) orally once a day (12-16-22)  atorvastatin 40 mg oral tablet: 1 tab(s) orally once a day (12-16-22)  buPROPion 75 mg oral tablet: 1 tab(s) orally 3 times a day (12-16-22)  cholecalciferol 125 mcg (5000 intl units) oral capsule: 1 cap(s) orally once a day (12-16-22)  furosemide 20 mg oral tablet: 1 tab(s) orally once a day (12-16-22)  Lantus 100 units/mL subcutaneous solution: 12  subcutaneous once a day (at bedtime) (12-16-22)  losartan 50 mg oral tablet: 1 tab(s) orally once a day (12-16-22)  metoprolol succinate 25 mg oral tablet, extended release: 1 tab(s) orally once a day (12-16-22)  molnupiravir 200 mg oral capsule: 4 cap(s) orally every 12 hours (12-16-22)        LABS:                        9.5    16.24 )-----------( 400      ( 17 Dec 2022 06:35 )             29.0     12-17    148<H>  |  109<H>  |  54<H>  ----------------------------<  400<H>  3.7   |  25  |  1.11    Ca    9.0      17 Dec 2022 06:35  Phos  2.7     12-17  Mg     2.4     12-17    TPro  7.3  /  Alb  1.8<L>  /  TBili  0.6  /  DBili  x   /  AST  54<H>  /  ALT  55<H>  /  AlkPhos  179<H>  12-16             RADIOLOGY  CXR:  < from: Xray Chest 1 View-PORTABLE IMMEDIATE (12.16.22 @ 20:41) >  IMPRESSION: No acute parenchymal disease      < end of copied text >      CT:  < from: CT Head No Cont (12.16.22 @ 23:18) >  IMPRESSION:  There are acute versus subacute appearing infarcts in the right thalamus   and medial right occipital lobe, within the right posterior cerebral   artery vascular territory.    --- End of Report ---      < end of copied text >    ECHO:      VITALS:  T(C): 36.3 (12-17-22 @ 08:30), Max: 38.8 (12-16-22 @ 19:30)  T(F): 97.4 (12-17-22 @ 08:30), Max: 101.8 (12-16-22 @ 19:30)  HR: 86 (12-17-22 @ 08:30) (83 - 183)  BP: 135/65 (12-17-22 @ 08:30) (114/81 - 204/70)  BP(mean): 84 (12-17-22 @ 08:30) (84 - 100)  ABP: --  ABP(mean): --  RR: 29 (12-17-22 @ 08:30) (15 - 50)  SpO2: 99% (12-17-22 @ 08:30) (88% - 100%)  CVP(mm Hg): --  CVP(cm H2O): --    Ins and Outs     12-16-22 @ 07:01  -  12-17-22 @ 07:00  --------------------------------------------------------  IN: 875 mL / OUT: 750 mL / NET: 125 mL        Height (cm): 157.5 (12-16-22 @ 23:36)  Weight (kg): 39.6 (12-16-22 @ 23:36)  BMI (kg/m2): 16 (12-16-22 @ 23:36)        I&O's Detail    16 Dec 2022 07:01  -  17 Dec 2022 07:00  --------------------------------------------------------  IN:    IV PiggyBack: 250 mL    IV PiggyBack: 100 mL    Lactated Ringers: 525 mL  Total IN: 875 mL    OUT:    Indwelling Catheter - Urethral (mL): 700 mL    Voided (mL): 50 mL  Total OUT: 750 mL    Total NET: 125 mL

## 2022-12-18 NOTE — PROGRESS NOTE ADULT - NUTRITIONAL ASSESSMENT
This patient has been assessed with a concern for Malnutrition and has been determined to have a diagnosis/diagnoses of Moderate protein-calorie malnutrition and Underweight (BMI < 19).    This patient is being managed with:   Diet NPO-  Entered: Dec 16 2022 10:20PM

## 2022-12-18 NOTE — PROGRESS NOTE ADULT - REASON FOR ADMISSION
acute hypoxic resp failure

## 2022-12-22 LAB
CULTURE RESULTS: SIGNIFICANT CHANGE UP
SPECIMEN SOURCE: SIGNIFICANT CHANGE UP

## 2023-11-30 RX ORDER — APIXABAN 2.5 MG/1
1 TABLET, FILM COATED ORAL
Qty: 0 | Refills: 0 | DISCHARGE

## 2023-11-30 RX ORDER — BUPROPION HYDROCHLORIDE 150 MG/1
1 TABLET, EXTENDED RELEASE ORAL
Qty: 0 | Refills: 0 | DISCHARGE

## 2023-11-30 RX ORDER — METOPROLOL TARTRATE 50 MG
1 TABLET ORAL
Qty: 0 | Refills: 0 | DISCHARGE

## 2023-11-30 RX ORDER — MOLNUPIRAVIR 200 MG/1
4 CAPSULE ORAL
Qty: 0 | Refills: 0 | DISCHARGE

## 2023-11-30 RX ORDER — AMIODARONE HYDROCHLORIDE 400 MG/1
1 TABLET ORAL
Qty: 0 | Refills: 0 | DISCHARGE

## 2023-11-30 RX ORDER — LOSARTAN POTASSIUM 100 MG/1
1 TABLET, FILM COATED ORAL
Qty: 0 | Refills: 0 | DISCHARGE

## 2023-11-30 RX ORDER — INSULIN GLARGINE 100 [IU]/ML
12 INJECTION, SOLUTION SUBCUTANEOUS
Qty: 0 | Refills: 0 | DISCHARGE

## 2023-11-30 RX ORDER — FUROSEMIDE 40 MG
1 TABLET ORAL
Qty: 0 | Refills: 0 | DISCHARGE

## 2023-11-30 RX ORDER — ASCORBIC ACID 60 MG
1 TABLET,CHEWABLE ORAL
Qty: 0 | Refills: 0 | DISCHARGE

## 2023-11-30 RX ORDER — ASPIRIN/CALCIUM CARB/MAGNESIUM 324 MG
1 TABLET ORAL
Qty: 0 | Refills: 0 | DISCHARGE

## 2023-11-30 RX ORDER — ATORVASTATIN CALCIUM 80 MG/1
1 TABLET, FILM COATED ORAL
Qty: 0 | Refills: 0 | DISCHARGE

## 2023-11-30 RX ORDER — CHOLECALCIFEROL (VITAMIN D3) 125 MCG
1 CAPSULE ORAL
Qty: 0 | Refills: 0 | DISCHARGE